# Patient Record
Sex: MALE | Race: OTHER | Employment: OTHER | URBAN - METROPOLITAN AREA
[De-identification: names, ages, dates, MRNs, and addresses within clinical notes are randomized per-mention and may not be internally consistent; named-entity substitution may affect disease eponyms.]

---

## 2018-07-10 ENCOUNTER — NEW PATIENT COMPREHENSIVE (OUTPATIENT)
Dept: URBAN - METROPOLITAN AREA CLINIC 43 | Facility: CLINIC | Age: 73
End: 2018-07-10

## 2018-07-10 DIAGNOSIS — E11.9: ICD-10-CM

## 2018-07-10 DIAGNOSIS — H25.9: ICD-10-CM

## 2018-07-10 DIAGNOSIS — H43.813: ICD-10-CM

## 2018-07-10 PROCEDURE — 92015 DETERMINE REFRACTIVE STATE: CPT

## 2018-07-10 PROCEDURE — 92004 COMPRE OPH EXAM NEW PT 1/>: CPT

## 2018-07-10 ASSESSMENT — VISUAL ACUITY
OS_CC: J1-
OS_SC: J3
OD_BAT: 20/100
OD_CC: 20/60+2
OS_BAT: 20/80
OD_CC: J8-
OS_SC: 20/100
OS_CC: 20/30-2
OD_SC: 20/80-1
OD_PH: 20/60-1

## 2018-07-10 ASSESSMENT — TONOMETRY
OD_IOP_MMHG: 13
OS_IOP_MMHG: 14

## 2019-03-28 NOTE — PATIENT DISCUSSION
"""IOP controlled OU. Continue [facility] Q OU. Testing reviewed with patient today.  """

## 2019-07-26 ENCOUNTER — ESTABLISHED COMPREHENSIVE EXAM (OUTPATIENT)
Dept: URBAN - METROPOLITAN AREA CLINIC 43 | Facility: CLINIC | Age: 74
End: 2019-07-26

## 2019-07-26 DIAGNOSIS — H25.9: ICD-10-CM

## 2019-07-26 DIAGNOSIS — H43.813: ICD-10-CM

## 2019-07-26 DIAGNOSIS — E11.9: ICD-10-CM

## 2019-07-26 DIAGNOSIS — Z79.899: ICD-10-CM

## 2019-07-26 PROCEDURE — 92015 DETERMINE REFRACTIVE STATE: CPT

## 2019-07-26 PROCEDURE — 92014 COMPRE OPH EXAM EST PT 1/>: CPT

## 2019-07-26 ASSESSMENT — TONOMETRY
OS_IOP_MMHG: 12
OD_IOP_MMHG: 12

## 2019-07-26 ASSESSMENT — VISUAL ACUITY
OS_SC: J2
OS_SC: 20/400
OD_CC: 20/60
OD_SC: 20/70-1
OD_SC: >J12
OD_CC: J10
OS_CC: J2+
OS_CC: 20/30

## 2019-07-31 ENCOUNTER — FOLLOW UP (OUTPATIENT)
Dept: URBAN - METROPOLITAN AREA CLINIC 43 | Facility: CLINIC | Age: 74
End: 2019-07-31

## 2019-07-31 DIAGNOSIS — Z79.899: ICD-10-CM

## 2019-07-31 PROCEDURE — 92083 EXTENDED VISUAL FIELD XM: CPT

## 2019-07-31 PROCEDURE — 92012 INTRM OPH EXAM EST PATIENT: CPT

## 2019-07-31 ASSESSMENT — VISUAL ACUITY
OS_CC: 20/30-1
OD_CC: 20/50+1

## 2019-07-31 ASSESSMENT — TONOMETRY
OD_IOP_MMHG: 10
OS_IOP_MMHG: 11

## 2020-01-31 NOTE — PATIENT DISCUSSION
"""Recommend Bilateral Levator Repair 02/18/2020 at [facility]. Discontinue blood thinner 6 days days prior to surgery.  Discussed with patient VICK

## 2020-02-26 NOTE — PATIENT DISCUSSION
"""S/P Bilateral Levator Repair. Healing well. Sutured removed today.  Instructed patient to continue ""

## 2020-07-31 ENCOUNTER — ESTABLISHED COMPREHENSIVE EXAM (OUTPATIENT)
Dept: URBAN - METROPOLITAN AREA CLINIC 43 | Facility: CLINIC | Age: 75
End: 2020-07-31

## 2020-07-31 DIAGNOSIS — E11.9: ICD-10-CM

## 2020-07-31 DIAGNOSIS — H43.813: ICD-10-CM

## 2020-07-31 DIAGNOSIS — Z79.899: ICD-10-CM

## 2020-07-31 DIAGNOSIS — H25.9: ICD-10-CM

## 2020-07-31 PROCEDURE — 92015 DETERMINE REFRACTIVE STATE: CPT

## 2020-07-31 PROCEDURE — 92014 COMPRE OPH EXAM EST PT 1/>: CPT

## 2020-07-31 PROCEDURE — 92083 EXTENDED VISUAL FIELD XM: CPT

## 2020-07-31 ASSESSMENT — TONOMETRY
OD_IOP_MMHG: 10
OS_IOP_MMHG: 10

## 2020-07-31 ASSESSMENT — VISUAL ACUITY
OS_SC: J3
OD_CC: J10
OS_CC: J2
OD_SC: >J12
OD_CC: 20/60-1
OS_CC: 20/30-1
OS_SC: 20/400
OD_SC: 20/80

## 2020-08-04 ENCOUNTER — CATARACT CONSULT (OUTPATIENT)
Dept: URBAN - METROPOLITAN AREA CLINIC 43 | Facility: CLINIC | Age: 75
End: 2020-08-04

## 2020-08-04 DIAGNOSIS — E11.9: ICD-10-CM

## 2020-08-04 DIAGNOSIS — H18.51: ICD-10-CM

## 2020-08-04 DIAGNOSIS — H25.812: ICD-10-CM

## 2020-08-04 DIAGNOSIS — Z79.899: ICD-10-CM

## 2020-08-04 DIAGNOSIS — H43.813: ICD-10-CM

## 2020-08-04 DIAGNOSIS — H25.811: ICD-10-CM

## 2020-08-04 PROCEDURE — 92025-3 CORNEAL TOPO, REFUSED

## 2020-08-04 PROCEDURE — 99214 OFFICE O/P EST MOD 30 MIN: CPT

## 2020-08-04 PROCEDURE — 92286 ANT SGM IMG I&R SPECLR MIC: CPT

## 2020-08-04 PROCEDURE — 92134 CPTRZ OPH DX IMG PST SGM RTA: CPT

## 2020-08-04 PROCEDURE — V2799PMN IMPRIMIS PRED-MOXI-NEPAF 5ML

## 2020-08-04 PROCEDURE — 92136TC50 INTERFEROMETRY - TECHNICAL COMPONENT

## 2020-08-04 ASSESSMENT — VISUAL ACUITY
OS_BAT: 20/200
OD_RAM: 20/20
OS_SC: J8
OD_SC: J12
OD_BAT: 20/400
OS_CC: 20/40
OS_CC: J2
OD_CC: 20/60
OD_SC: 20/100+1
OS_AM: 20/20
OD_CC: J4
OS_SC: 20/100-1

## 2020-08-04 ASSESSMENT — TONOMETRY
OD_IOP_MMHG: 11
OS_IOP_MMHG: 11

## 2020-08-17 ENCOUNTER — SURGERY/PROCEDURE (OUTPATIENT)
Dept: URBAN - METROPOLITAN AREA CLINIC 43 | Facility: CLINIC | Age: 75
End: 2020-08-17

## 2020-08-17 ENCOUNTER — PRE-OP/H&P (OUTPATIENT)
Dept: URBAN - METROPOLITAN AREA CLINIC 39 | Facility: CLINIC | Age: 75
End: 2020-08-17

## 2020-08-17 DIAGNOSIS — H25.811: ICD-10-CM

## 2020-08-17 PROCEDURE — 99211T TECH SERVICE

## 2020-08-17 PROCEDURE — 66984 XCAPSL CTRC RMVL W/O ECP: CPT

## 2020-08-18 ENCOUNTER — POST OP/EVAL OF SECOND EYE (OUTPATIENT)
Dept: URBAN - METROPOLITAN AREA CLINIC 43 | Facility: CLINIC | Age: 75
End: 2020-08-18

## 2020-08-18 DIAGNOSIS — Z96.1: ICD-10-CM

## 2020-08-18 DIAGNOSIS — H25.812: ICD-10-CM

## 2020-08-18 PROCEDURE — 92012 INTRM OPH EXAM EST PATIENT: CPT

## 2020-08-18 ASSESSMENT — TONOMETRY
OD_IOP_MMHG: 12
OS_IOP_MMHG: 18

## 2020-08-18 ASSESSMENT — VISUAL ACUITY
OD_SC: 20/40
OS_SC: J1
OD_SC: J12
OS_SC: 20/100

## 2020-08-26 ENCOUNTER — PRE-OP/H&P (OUTPATIENT)
Dept: URBAN - METROPOLITAN AREA CLINIC 39 | Facility: CLINIC | Age: 75
End: 2020-08-26

## 2020-08-26 ENCOUNTER — SURGERY/PROCEDURE (OUTPATIENT)
Dept: URBAN - METROPOLITAN AREA CLINIC 43 | Facility: CLINIC | Age: 75
End: 2020-08-26

## 2020-08-26 DIAGNOSIS — H25.812: ICD-10-CM

## 2020-08-26 DIAGNOSIS — H18.51: ICD-10-CM

## 2020-08-26 DIAGNOSIS — E11.9: ICD-10-CM

## 2020-08-26 DIAGNOSIS — Z96.1: ICD-10-CM

## 2020-08-26 DIAGNOSIS — H53.001: ICD-10-CM

## 2020-08-26 DIAGNOSIS — H53.2: ICD-10-CM

## 2020-08-26 DIAGNOSIS — Z79.899: ICD-10-CM

## 2020-08-26 PROCEDURE — 66984 XCAPSL CTRC RMVL W/O ECP: CPT

## 2020-08-26 PROCEDURE — 99211T TECH SERVICE

## 2020-08-26 ASSESSMENT — VISUAL ACUITY
OD_SC: J12
OS_SC: 20/200
OD_SC: 20/40
OS_SC: J1

## 2020-08-26 ASSESSMENT — TONOMETRY
OD_IOP_MMHG: 16
OS_IOP_MMHG: 17

## 2020-08-27 ENCOUNTER — CATARACT POST-OP 1-DAY (OUTPATIENT)
Dept: URBAN - METROPOLITAN AREA CLINIC 43 | Facility: CLINIC | Age: 75
End: 2020-08-27

## 2020-08-27 DIAGNOSIS — Z96.1: ICD-10-CM

## 2020-08-27 PROCEDURE — 99024 POSTOP FOLLOW-UP VISIT: CPT

## 2020-08-27 ASSESSMENT — VISUAL ACUITY
OS_SC: 20/25+2
OS_SC: J12
OD_SC: J12
OD_SC: 20/40+2

## 2020-08-27 ASSESSMENT — TONOMETRY
OS_IOP_MMHG: 11
OD_IOP_MMHG: 10

## 2020-08-27 NOTE — PATIENT DISCUSSION
"""S/P LEVATOR PALPEBRAE RESECTION. (2/18/20) Last visit patient c/o of tight eyelids Alert-The patient is alert, awake and responds to voice. The patient is oriented to time, place, and person. The triage nurse is able to obtain subjective information.

## 2020-09-03 ENCOUNTER — POST-OP CATARACT (OUTPATIENT)
Dept: URBAN - METROPOLITAN AREA CLINIC 43 | Facility: CLINIC | Age: 75
End: 2020-09-03

## 2020-09-03 DIAGNOSIS — Z96.1: ICD-10-CM

## 2020-09-03 PROCEDURE — 99024 POSTOP FOLLOW-UP VISIT: CPT

## 2020-09-03 ASSESSMENT — VISUAL ACUITY
OS_SC: 20/25
OD_SC: 20/40-2

## 2020-09-03 ASSESSMENT — TONOMETRY
OD_IOP_MMHG: 12
OS_IOP_MMHG: 12

## 2021-03-03 ENCOUNTER — ESTABLISHED COMPREHENSIVE EXAM (OUTPATIENT)
Dept: URBAN - METROPOLITAN AREA CLINIC 43 | Facility: CLINIC | Age: 76
End: 2021-03-03

## 2021-03-03 DIAGNOSIS — H43.813: ICD-10-CM

## 2021-03-03 DIAGNOSIS — E11.9: ICD-10-CM

## 2021-03-03 DIAGNOSIS — Z79.899: ICD-10-CM

## 2021-03-03 PROCEDURE — 92014 COMPRE OPH EXAM EST PT 1/>: CPT

## 2021-03-03 PROCEDURE — 92015 DETERMINE REFRACTIVE STATE: CPT

## 2021-03-03 ASSESSMENT — VISUAL ACUITY
OD_CC: J6
OD_SC: J12
OD_SC: 20/40-2
OS_CC: J2
OD_CC: 20/40+2
OS_SC: 20/20-1
OS_SC: J10
OS_CC: 20/25

## 2021-03-03 ASSESSMENT — TONOMETRY
OD_IOP_MMHG: 10
OS_IOP_MMHG: 12

## 2022-03-03 ENCOUNTER — COMPREHENSIVE EXAM (OUTPATIENT)
Dept: URBAN - METROPOLITAN AREA CLINIC 43 | Facility: CLINIC | Age: 77
End: 2022-03-03

## 2022-03-03 DIAGNOSIS — E11.9: ICD-10-CM

## 2022-03-03 DIAGNOSIS — H52.203: ICD-10-CM

## 2022-03-03 DIAGNOSIS — Z79.899: ICD-10-CM

## 2022-03-03 DIAGNOSIS — H43.813: ICD-10-CM

## 2022-03-03 PROCEDURE — 92015 DETERMINE REFRACTIVE STATE: CPT

## 2022-03-03 PROCEDURE — 92083 EXTENDED VISUAL FIELD XM: CPT

## 2022-03-03 PROCEDURE — 92014 COMPRE OPH EXAM EST PT 1/>: CPT

## 2022-03-03 ASSESSMENT — TONOMETRY
OS_IOP_MMHG: 12
OD_IOP_MMHG: 13

## 2022-03-03 ASSESSMENT — VISUAL ACUITY
OU_CC: 20/20-1
OU_CC: J1
OS_CC: 20/30+2
OD_CC: J6
OS_CC: J1
OD_CC: 20/70+1
OS_SC: 20/25-2
OU_SC: 20/20
OD_SC: 20/40-2
OD_PH: 20/60-1

## 2022-03-15 ENCOUNTER — CONSULTATION/EVALUATION (OUTPATIENT)
Dept: URBAN - METROPOLITAN AREA CLINIC 39 | Facility: CLINIC | Age: 77
End: 2022-03-15

## 2022-03-15 ENCOUNTER — SURGERY/PROCEDURE (OUTPATIENT)
Dept: URBAN - METROPOLITAN AREA SURGERY 14 | Facility: SURGERY | Age: 77
End: 2022-03-15

## 2022-03-15 DIAGNOSIS — H35.373: ICD-10-CM

## 2022-03-15 DIAGNOSIS — E11.9: ICD-10-CM

## 2022-03-15 DIAGNOSIS — H43.813: ICD-10-CM

## 2022-03-15 DIAGNOSIS — Z79.899: ICD-10-CM

## 2022-03-15 DIAGNOSIS — H26.491: ICD-10-CM

## 2022-03-15 PROCEDURE — 66821 AFTER CATARACT LASER SURGERY: CPT

## 2022-03-15 PROCEDURE — 92014 COMPRE OPH EXAM EST PT 1/>: CPT

## 2022-03-15 ASSESSMENT — VISUAL ACUITY
OD_CC: 20/60+2
OS_CC: 20/25+2

## 2022-03-15 ASSESSMENT — TONOMETRY
OD_IOP_MMHG: 12
OS_IOP_MMHG: 11

## 2022-03-22 ENCOUNTER — POST-OP (OUTPATIENT)
Dept: URBAN - METROPOLITAN AREA CLINIC 43 | Facility: CLINIC | Age: 77
End: 2022-03-22

## 2022-03-22 DIAGNOSIS — Z98.890: ICD-10-CM

## 2022-03-22 PROCEDURE — 99024 POSTOP FOLLOW-UP VISIT: CPT

## 2022-03-22 ASSESSMENT — VISUAL ACUITY
OD_SC: 20/40
OS_CC: J1
OS_CC: 20/20
OS_SC: 20/20-2
OD_CC: J4
OD_CC: 20/50-1

## 2022-03-22 ASSESSMENT — TONOMETRY
OS_IOP_MMHG: 14
OD_IOP_MMHG: 13

## 2022-04-26 NOTE — PATIENT DISCUSSION
NOT VISUALLY SIGNIFICANT: Informed patient that their cataract is not visually significant or does not meet the criteria for cataract surgery. Recommend attention to cataract symptoms monitoring by regular examinations. Patient instructed to call with changes in vision. Quality 130: Documentation Of Current Medications In The Medical Record: Current Medications Documented Additional Notes: Influenza vaccine not administered due to unspecified reasons.\\nPt has received Covid-19 vaccines plus boosters. Quality 110: Preventive Care And Screening: Influenza Immunization: Influenza Immunization not Administered for Documented Reasons. Detail Level: Detailed

## 2022-08-04 NOTE — PATIENT DISCUSSION
CV OD/OS/MONO: Discussed with patient in detail laser-assisted vs traditional cataract surgery and all available lens options as well as their associated risks, benefits, limitations and out-of-pocket costs. Patient is a candidate for Custom Vision OU. Patient wishes to proceed with cataract surgery with Custom Vision OD. Proceed with cataract surgery OS with Custom Vision, based on confirmation of first eye results, and patient's complaint. The patient understands that a monofocal IOL will allow him/her to see clearly at one focal point and elects to proceed with monovision correction with OS set for distance and OD set for near. 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 The risks, benefits, and alternatives to surgery were discussed and the patient's questions were answered.

## 2022-08-29 NOTE — PATIENT DISCUSSION
Post op instructions reviewed with patients including the use of drops.  Because of steroid response, post-op steroid reduced to QAM.

## 2022-09-12 NOTE — PATIENT DISCUSSION
Will send referral out to Dr. Gregorio Hernandez for glaucoma evaluation and recommendation.  IOP controlled on MMT OD.  Patient allergic to sulfa and therefore excluded from oral or topical carbonic anhydrase inhibitors.  Patient unilaterally stopped topical OAG therapy due to burning in the immediate post-op period resulting pressure spikes OU. Pressure spike OD resulted in glaucomatous vision loss.  Patient did not report vision change OD until POD #6 during his office visit. Because of outcome OD, patient will need to have aggressive IOP/OAG management OS prior to cataract surgery.  Cataract surgery OS is postponed for the immediate term.

## 2022-10-06 NOTE — PATIENT DISCUSSION
Discussed with patient today the importance of having an  OAG procedure. Explained that he is on maximum drop therapy and if IOP goes up he is running risk of VA loss. Strongly suggest to patient to have glaucoma procedure with Dr. Varghese Hollis to decrease use of drops and decrease risk of VA loss.

## 2022-10-06 NOTE — PATIENT DISCUSSION
Will send referral out to Dr. Ty Yañez for glaucoma evaluation and recommendation.  IOP controlled on MMT OD.  Patient allergic to sulfa and therefore excluded from oral or topical carbonic anhydrase inhibitors.  Patient unilaterally stopped topical OAG therapy due to burning in the immediate post-op period resulting pressure spikes OU. Pressure spike OD resulted in glaucomatous vision loss.  Patient did not report vision change OD until POD #6 during his office visit. Because of outcome OD, patient will need to have aggressive IOP/OAG management OS prior to cataract surgery.  Cataract surgery OS is postponed for the immediate term.

## 2022-10-06 NOTE — PROCEDURE NOTE: CLINICAL
PROCEDURE NOTE: Punctal Plugs, Bilateral OU. Diagnosis: Dry Eye Syndrome. Informed consent was obtained. Size/location of plugs inserted: 0.7mm. Patient tolerated procedure without complication or difficulty. Chely Xiao

## 2022-10-27 NOTE — PATIENT DISCUSSION
Discussed with patient today the importance of having an  OAG procedure. Explained that he is on maximum drop therapy and if IOP goes up he is running risk of VA loss. Strongly suggest to patient to have glaucoma procedure with Dr. Lucy Blanca to decrease use of drops and decrease risk of VA loss.

## 2022-10-27 NOTE — PATIENT DISCUSSION
Will send referral out to Dr. Bonner Krabbe for glaucoma evaluation and recommendation.  IOP controlled on MMT OD.  Patient allergic to sulfa and therefore excluded from oral or topical carbonic anhydrase inhibitors.  Patient unilaterally stopped topical OAG therapy due to burning in the immediate post-op period resulting pressure spikes OU. Pressure spike OD resulted in glaucomatous vision loss.  Patient did not report vision change OD until POD #6 during his office visit. Because of outcome OD, patient will need to have aggressive IOP/OAG management OS prior to cataract surgery.  Cataract surgery OS is postponed for the immediate term.

## 2022-10-27 NOTE — PATIENT DISCUSSION
explained to patient that we will send over consult for [facility], that it will probably take a while to get in to them.

## 2022-11-28 NOTE — PATIENT DISCUSSION
Discussed with patient today the importance of having an  OAG procedure. Explained that he is on maximum drop therapy and if IOP goes up he is running risk of VA loss. Strongly suggest to patient to have glaucoma procedure with Dr. Vanda Catherine to decrease use of drops and decrease risk of VA loss.

## 2022-11-28 NOTE — PATIENT DISCUSSION
Will send referral out to Dr. María Sanchez for glaucoma evaluation and recommendation.  IOP controlled on MMT OD.  Patient allergic to sulfa and therefore excluded from oral or topical carbonic anhydrase inhibitors.  Patient unilaterally stopped topical OAG therapy due to burning in the immediate post-op period resulting pressure spikes OU. Pressure spike OD resulted in glaucomatous vision loss.  Patient did not report vision change OD until POD #6 during his office visit. Because of outcome OD, patient will need to have aggressive IOP/OAG management OS prior to cataract surgery.  Cataract surgery OS is postponed for the immediate term.

## 2022-11-28 NOTE — PATIENT DISCUSSION
Patient has an appointment Jan 3rd at [facility] [place].

## 2022-11-28 NOTE — PATIENT DISCUSSION
explained to patient that we will send over consult for [facility], that it will probably take a while to get in to them.

## 2022-12-15 NOTE — PATIENT DISCUSSION
Will send referral out to Dr. Monica Ramos for glaucoma evaluation and recommendation.  IOP controlled on MMT OD.  Patient allergic to sulfa and therefore excluded from oral or topical carbonic anhydrase inhibitors.  Patient unilaterally stopped topical OAG therapy due to burning in the immediate post-op period resulting pressure spikes OU. Pressure spike OD resulted in glaucomatous vision loss.  Patient did not report vision change OD until POD #6 during his office visit. Because of outcome OD, patient will need to have aggressive IOP/OAG management OS prior to cataract surgery.  Cataract surgery OS is postponed for the immediate term.

## 2022-12-15 NOTE — PATIENT DISCUSSION
Discussed with patient today the importance of having an  OAG procedure. Explained that he is on maximum drop therapy and if IOP goes up he is running risk of VA loss. Strongly suggest to patient to have glaucoma procedure with Dr. Neeta Mace to decrease use of drops and decrease risk of VA loss.

## 2023-01-26 NOTE — PATIENT DISCUSSION
Discussed with patient today the importance of having an  OAG procedure. Explained that he is on maximum drop therapy and if IOP goes up he is running risk of VA loss.

## 2023-01-26 NOTE — PATIENT DISCUSSION
IOP controlled on MMT OD.  Patient allergic to sulfa and therefore excluded from oral or topical carbonic anhydrase inhibitors.  Patient unilaterally stopped topical OAG therapy due to burning in the immediate post-op period resulting pressure spikes OU. Pressure spike OD resulted in glaucomatous vision loss.  Patient did not report vision change OD until POD #6 during his office visit. Because of outcome OD, patient will need to have aggressive IOP/OAG management OS prior to cataract surgery.  Cataract surgery OS is postponed for the immediate term.

## 2023-04-04 ENCOUNTER — COMPREHENSIVE EXAM (OUTPATIENT)
Dept: URBAN - METROPOLITAN AREA CLINIC 43 | Facility: CLINIC | Age: 78
End: 2023-04-04

## 2023-04-04 DIAGNOSIS — E11.9: ICD-10-CM

## 2023-04-04 DIAGNOSIS — Z79.899: ICD-10-CM

## 2023-04-04 DIAGNOSIS — H35.373: ICD-10-CM

## 2023-04-04 DIAGNOSIS — H43.813: ICD-10-CM

## 2023-04-04 DIAGNOSIS — H52.203: ICD-10-CM

## 2023-04-04 PROCEDURE — 92083 EXTENDED VISUAL FIELD XM: CPT

## 2023-04-04 PROCEDURE — 92014 COMPRE OPH EXAM EST PT 1/>: CPT

## 2023-04-04 PROCEDURE — 92015 DETERMINE REFRACTIVE STATE: CPT

## 2023-04-04 ASSESSMENT — VISUAL ACUITY
OD_CC: 20/40-2
OD_CC: J8
OS_SC: 20/20-1
OS_CC: 20/25-2
OD_SC: J12
OS_CC: J1
OD_SC: 20/40
OS_SC: J10

## 2023-04-04 ASSESSMENT — TONOMETRY
OD_IOP_MMHG: 10
OS_IOP_MMHG: 11

## 2024-04-15 ENCOUNTER — COMPREHENSIVE EXAM (OUTPATIENT)
Dept: URBAN - METROPOLITAN AREA CLINIC 43 | Facility: CLINIC | Age: 79
End: 2024-04-15

## 2024-04-15 DIAGNOSIS — E11.9: ICD-10-CM

## 2024-04-15 DIAGNOSIS — H40.013: ICD-10-CM

## 2024-04-15 DIAGNOSIS — H52.203: ICD-10-CM

## 2024-04-15 DIAGNOSIS — H35.373: ICD-10-CM

## 2024-04-15 DIAGNOSIS — Z79.899: ICD-10-CM

## 2024-04-15 DIAGNOSIS — H43.813: ICD-10-CM

## 2024-04-15 PROCEDURE — 92015 DETERMINE REFRACTIVE STATE: CPT

## 2024-04-15 PROCEDURE — 92014 COMPRE OPH EXAM EST PT 1/>: CPT

## 2024-04-15 PROCEDURE — 92083 EXTENDED VISUAL FIELD XM: CPT

## 2024-04-15 ASSESSMENT — VISUAL ACUITY
OD_CC: 20/60+1
OS_SC: J10
OD_SC: 20/60+2
OS_SC: 20/25+2
OD_CC: J3
OS_CC: 20/20-2
OS_CC: J1
OD_SC: J12
OU_CC: 20/20-2

## 2024-04-15 ASSESSMENT — TONOMETRY
OS_IOP_MMHG: 11
OD_IOP_MMHG: 11

## 2025-05-28 ENCOUNTER — COMPREHENSIVE EXAM (OUTPATIENT)
Age: 80
End: 2025-05-28

## 2025-05-28 DIAGNOSIS — Z79.899: ICD-10-CM

## 2025-05-28 DIAGNOSIS — H35.373: ICD-10-CM

## 2025-05-28 DIAGNOSIS — H40.013: ICD-10-CM

## 2025-05-28 DIAGNOSIS — E11.9: ICD-10-CM

## 2025-05-28 DIAGNOSIS — H43.813: ICD-10-CM

## 2025-05-28 DIAGNOSIS — H52.203: ICD-10-CM

## 2025-05-28 PROCEDURE — 92015 DETERMINE REFRACTIVE STATE: CPT

## 2025-05-28 PROCEDURE — 92014 COMPRE OPH EXAM EST PT 1/>: CPT

## 2025-06-05 ENCOUNTER — FOLLOW UP (OUTPATIENT)
Age: 80
End: 2025-06-05

## 2025-06-05 DIAGNOSIS — Z79.899: ICD-10-CM

## 2025-06-05 PROCEDURE — 92012 INTRM OPH EXAM EST PATIENT: CPT

## 2025-06-05 PROCEDURE — 92083 EXTENDED VISUAL FIELD XM: CPT
